# Patient Record
Sex: FEMALE | Race: WHITE | URBAN - METROPOLITAN AREA
[De-identification: names, ages, dates, MRNs, and addresses within clinical notes are randomized per-mention and may not be internally consistent; named-entity substitution may affect disease eponyms.]

---

## 2022-08-15 NOTE — PROGRESS NOTES
Butch Cameron is a healthy 23 y.o. female who presents to establish care. She is a student at [employer]. Medical history significant for: Generalized Anxiety and Borderline Personality Disorder  - Sees Psychiatrist through [facility]. Ishmael Flank Therapist  - Reports skin alta on left forearm since Thursday and right abdomen since Saturday. - 1-2 significant headaches every month. Relieved with ibuprofen. No changes in vision  - Follows optometrist regularly and wears glasses and contact lenses. - Lactose intolerance and gluten: bloating, cramps, and nausea with some dairy and high gluten foods. She feels she manages this well with her dietary choices. [facility]  Saw gynecologist last year and has future appointment scheduled. Notes history of cysts, well controlled. On OCP's    Review of Systems   General/Constitutional:   No headache, fever, fatigue, weight loss or weight gain       Eyes:   No redness, pruritis, pain, visual changes, swelling, or discharge      Ears:    No pain, loss or changes in hearing     Neck:   No swelling, masses, stiffness, pain, or limited movement     Cardiac:    No chest pain      Respiratory:   No cough or shortness of breath     GI:   No nausea/vomiting, diarrhea, abdominal pain, bloody or dark stools       :   No dysuria or  hematuria    Neurological:   No loss of consciousness, dizziness, seizures, dysarthria, cognitive changes, memory changes,  problems with balance, or unilateral weakness     Skin: Two skin lesions noted: one on left forearm and one on right abdomen (see physical for picture and description)    Current Medications  Current medications include:   Current Outpatient Medications   Medication Sig    Junel FE 1/20, 28, 1 mg-20 mcg (21)/75 mg (7) tab TAKE 1 TABLET BY MOUTH EVERY DAY FOR 90 DAYS    cetirizine (ZyrTEC) 10 mg tablet Take 10 mg by mouth two (2) times a day.     FLUoxetine (PROzac) 20 mg capsule Take 1 Capsule by mouth in the morning. Indications: repeated episodes of anxiety    hydrOXYzine HCL (ATARAX) 10 mg tablet Take 1 Tablet by mouth three (3) times daily as needed for Anxiety for up to 10 days. No current facility-administered medications for this visit. Allergies  No Known Allergies    Past Medical History  Past Medical History:   Diagnosis Date    Borderline personality disorder ([facility] [address]     Cyst of ovary     SARAH (generalized anxiety disorder)        Past Surgical History   Past Surgical History:   Procedure Laterality Date    HX KNEE ARTHROSCOPY Left     remove needle from knee       Family History  Family History   Problem Relation Age of Onset    Hypertension Mother        Social History  Social History     Socioeconomic History    Marital status: SINGLE     Spouse name: Not on file    Number of children: Not on file    Years of education: Not on file    Highest education level: Not on file   Occupational History    Not on file   Tobacco Use    Smoking status: Never    Smokeless tobacco: Never   Vaping Use    Vaping Use: Never used   Substance and Sexual Activity    Alcohol use: Yes     Alcohol/week: 1.0 standard drink     Types: 1 Standard drinks or equivalent per week    Drug use: Never    Sexual activity: Not Currently   Other Topics Concern    Not on file   Social History Narrative    Not on file     Social Determinants of Health     Financial Resource Strain: Not on file   Food Insecurity: Not on file   Transportation Needs: Not on file   Physical Activity: Not on file   Stress: Not on file   Social Connections: Not on file   Intimate Partner Violence: Not on file   Housing Stability: Not on file     Immunizations    There is no immunization history on file for this patient.       Objective   Vital Signs  Visit Vitals  /67 (BP 1 Location: Right arm, BP Patient Position: Sitting, BP Cuff Size: Adult)   Pulse 91   Temp 98.3 °F (36.8 °C) (Oral)   Resp 18   Ht 5' 3\" (1.6 m)   Wt 137 lb (62.1 kg)   LMP 08/09/2022 SpO2 98%   BMI 24.27 kg/m²       Physical Examination  GEN: No apparent distress. Alert and oriented and responds to all questions appropriately. EYES:  Conjunctiva clear; pupils round and reactive to light; extraocular movements areintact. EAR: External ears are normal.  Tympanic membranes are clear and without effusion. NOSE: Turbinates are within normal limits. No drainage  OROPHYARYNX: No oral lesions or exudates. NECK:  Supple; no masses; thyroid normal           LUNGS: Respirations unlabored; clear to auscultation bilaterally  CARDIOVASCULAR: Regular, rate, and rhythm without murmurs, gallops or rubs   ABDOMEN: Soft; nontender; nondistended; normoactive bowel sounds; no masses or organomegaly  NEUROLOGIC:  No focal neurologic deficits. Strength and sensation grossly intact. Coordination and gait grossly intact. EXT: Well perfused. No edema. SKIN: Slightly raised erythematous papule on left abdomen for 4 days        Assessment:   Silverio Hoover is a 23 y.o. here to establish to care with a history of generalized anxiety disorder. Plan:   Generalized Anxiety Disorder: Currently in-between psychiatrists and in need of medication refill. Overall, feels anxiety is well controlled. Reports coping mechanisms such as finger tapping and breathing with spikes of anxiety. Plays video games and other hobbies to decompress  - Refill atarax, prozac  - Requesting labwork ad direction of her new Psychiatrist as listed:  CMP, CBC, B12, Vitamin D, magnesium, TSH, A1c, Lipid Panel  New Patient Establishing:   - Ordering these labs listed above which will help illicit any underlying issues and will help establish a baseline as she establishes with the practice  - Up to date on vaccine. Received meningococcal vaccine when starting college  Appropriate labs, vaccines, imaging studies, and referrals ordered as listed above  Patient will be leaving to go back to [facility] on Saturday.  Return visit in one year  Skin Lesion: Depicted and described above. Likely some sort of insect bite  - Instructed not to scratch it and to monitor for any signs of changes in size, coloration, or border, as well as any signs of sickness such as fever. Told to call the clinic or go to ER if any of these symptoms present themselves.  -Likely will resolve spontaneously. Follow-up and Dispositions    Return in about 1 year (around 8/16/2023). I discussed the aforementioned diagnoses with the patient as well as the plan of care.      I discussed this patient with Dr. Jan Gonzales (Attending Physician)    Signed By:  Thien Elelr DO

## 2022-08-16 ENCOUNTER — OFFICE VISIT (OUTPATIENT)
Dept: FAMILY MEDICINE CLINIC | Age: 22
End: 2022-08-16
Payer: COMMERCIAL

## 2022-08-16 VITALS
OXYGEN SATURATION: 98 % | HEIGHT: 63 IN | TEMPERATURE: 98.3 F | RESPIRATION RATE: 18 BRPM | DIASTOLIC BLOOD PRESSURE: 67 MMHG | BODY MASS INDEX: 24.27 KG/M2 | WEIGHT: 137 LBS | HEART RATE: 91 BPM | SYSTOLIC BLOOD PRESSURE: 103 MMHG

## 2022-08-16 DIAGNOSIS — Z76.89 ENCOUNTER TO ESTABLISH CARE: ICD-10-CM

## 2022-08-16 DIAGNOSIS — F41.1 GENERALIZED ANXIETY DISORDER: ICD-10-CM

## 2022-08-16 DIAGNOSIS — Z00.00 WELL ADULT EXAM: ICD-10-CM

## 2022-08-16 PROCEDURE — 99385 PREV VISIT NEW AGE 18-39: CPT

## 2022-08-16 RX ORDER — CETIRIZINE HCL 10 MG
10 TABLET ORAL 2 TIMES DAILY
COMMUNITY

## 2022-08-16 RX ORDER — FLUOXETINE HYDROCHLORIDE 20 MG/1
20 CAPSULE ORAL DAILY
Qty: 30 CAPSULE | Refills: 3 | Status: SHIPPED | OUTPATIENT
Start: 2022-08-16

## 2022-08-16 RX ORDER — NORETHINDRONE ACETATE AND ETHINYL ESTRADIOL AND FERROUS FUMARATE 1MG-20(21)
KIT ORAL
COMMUNITY
Start: 2022-08-06

## 2022-08-16 RX ORDER — HYDROXYZINE HYDROCHLORIDE 10 MG/1
10 TABLET, FILM COATED ORAL
Qty: 30 TABLET | Refills: 0 | Status: SHIPPED | OUTPATIENT
Start: 2022-08-16 | End: 2022-08-26

## 2022-08-16 RX ORDER — FLUOXETINE 20 MG/1
TABLET ORAL
COMMUNITY
Start: 2022-07-01 | End: 2022-08-16 | Stop reason: SDUPTHER

## 2022-08-16 RX ORDER — HYDROXYZINE HYDROCHLORIDE 10 MG/1
TABLET, FILM COATED ORAL
COMMUNITY
Start: 2022-08-06 | End: 2022-08-16 | Stop reason: SDUPTHER

## 2022-08-16 NOTE — PROGRESS NOTES
Identified Patient with two Patient identifiers (Name and ). Two Patient Identifiers confirmed. Reviewed record in preparation for visit and have obtained necessary documentation. Chief Complaint   Patient presents with    Establish Care    Physical       Visit Vitals  /67 (BP 1 Location: Right arm, BP Patient Position: Sitting, BP Cuff Size: Adult)   Pulse 91   Temp 98.3 °F (36.8 °C) (Oral)   Resp 18   Ht 5' 3\" (1.6 m)   Wt 137 lb (62.1 kg)   SpO2 98%   BMI 24.27 kg/m²       1. Have you been to the ER, urgent care clinic since your last visit? Hospitalized since your last visit? No    2. Have you seen or consulted any other health care providers outside of the [address] [place] [address] since your last visit? Include any pap smears or colon screening.  No

## 2022-08-16 NOTE — PATIENT INSTRUCTIONS
It was a pleasure meeting you today. I have refilled your atarax and prozac. Please monitor your skin alta on your left abdomen for changes. Try not to scratch or irritate it. Please contact us with any concerning changes.

## 2022-08-17 LAB
25(OH)D3+25(OH)D2 SERPL-MCNC: 16.1 NG/ML (ref 30–100)
ALBUMIN SERPL-MCNC: 4.6 G/DL (ref 3.9–5)
ALBUMIN/GLOB SERPL: 1.9 {RATIO} (ref 1.2–2.2)
ALP SERPL-CCNC: 99 IU/L (ref 42–106)
ALT SERPL-CCNC: 11 IU/L (ref 0–32)
AST SERPL-CCNC: 18 IU/L (ref 0–40)
BILIRUB SERPL-MCNC: 0.2 MG/DL (ref 0–1.2)
BUN SERPL-MCNC: 8 MG/DL (ref 6–20)
BUN/CREAT SERPL: 12 (ref 9–23)
CALCIUM SERPL-MCNC: 9.5 MG/DL (ref 8.7–10.2)
CHLORIDE SERPL-SCNC: 102 MMOL/L (ref 96–106)
CHOLEST SERPL-MCNC: 228 MG/DL (ref 100–199)
CO2 SERPL-SCNC: 20 MMOL/L (ref 20–29)
CREAT SERPL-MCNC: 0.68 MG/DL (ref 0.57–1)
EGFR: 128 ML/MIN/1.73
ERYTHROCYTE [DISTWIDTH] IN BLOOD BY AUTOMATED COUNT: 12.3 % (ref 11.7–15.4)
EST. AVERAGE GLUCOSE BLD GHB EST-MCNC: 103 MG/DL
GLOBULIN SER CALC-MCNC: 2.4 G/DL (ref 1.5–4.5)
GLUCOSE SERPL-MCNC: 91 MG/DL (ref 65–99)
HBA1C MFR BLD: 5.2 % (ref 4.8–5.6)
HCT VFR BLD AUTO: 41.1 % (ref 34–46.6)
HDLC SERPL-MCNC: 45 MG/DL
HGB BLD-MCNC: 13.3 G/DL (ref 11.1–15.9)
IMP & REVIEW OF LAB RESULTS: NORMAL
LDLC SERPL CALC-MCNC: 145 MG/DL (ref 0–99)
MCH RBC QN AUTO: 27.7 PG (ref 26.6–33)
MCHC RBC AUTO-ENTMCNC: 32.4 G/DL (ref 31.5–35.7)
MCV RBC AUTO: 86 FL (ref 79–97)
PLATELET # BLD AUTO: 286 X10E3/UL (ref 150–450)
POTASSIUM SERPL-SCNC: 4.6 MMOL/L (ref 3.5–5.2)
PROT SERPL-MCNC: 7 G/DL (ref 6–8.5)
RBC # BLD AUTO: 4.8 X10E6/UL (ref 3.77–5.28)
SODIUM SERPL-SCNC: 139 MMOL/L (ref 134–144)
TRIGL SERPL-MCNC: 209 MG/DL (ref 0–149)
TSH SERPL DL<=0.005 MIU/L-ACNC: 1 UIU/ML (ref 0.45–4.5)
VIT B12 SERPL-MCNC: 357 PG/ML (ref 232–1245)
VLDLC SERPL CALC-MCNC: 38 MG/DL (ref 5–40)
WBC # BLD AUTO: 8.2 X10E3/UL (ref 3.4–10.8)

## 2022-08-18 NOTE — PROGRESS NOTES
Hello! I have reviewed your lab work. You are vitamin D deficient. I would suggest taking a vitamin D supplement (at least 1,000 IUs per day. You can get these at any pharmacy such as [facility] or even online on [employer]. You can get softgels or gummies. A brand I like is [employer] [place] [employer] (they have a vitamin D3 gummy). Take one per day. Your vitamin B12 is a bit low as well. Good sources of vitamin B12 come from animal products such as grass-fed beef, free-range eggs, and fish such as salmon. You can also supplement. Your cholesterol is a bit elevated, but that may have been due to eating lunch prior to lab testing. No concern now - just focus on getting regular exercise and eating a healthy diet. Your other labs look great. No signs of any thyroid abnormality. The levels we check for kidney, liver, and electrolytes are all normal.   Please reach out if you have any questions. It was nice meeting you.

## 2022-11-25 ENCOUNTER — OFFICE VISIT (OUTPATIENT)
Dept: FAMILY MEDICINE CLINIC | Age: 22
End: 2022-11-25
Payer: COMMERCIAL

## 2022-11-25 VITALS
OXYGEN SATURATION: 97 % | WEIGHT: 131 LBS | DIASTOLIC BLOOD PRESSURE: 87 MMHG | HEART RATE: 89 BPM | SYSTOLIC BLOOD PRESSURE: 126 MMHG | BODY MASS INDEX: 23.21 KG/M2

## 2022-11-25 DIAGNOSIS — F32.1 MODERATE MAJOR DEPRESSION (HCC): ICD-10-CM

## 2022-11-25 DIAGNOSIS — R41.840 ATTENTION AND CONCENTRATION DEFICIT: Primary | ICD-10-CM

## 2022-11-25 DIAGNOSIS — F41.1 GENERALIZED ANXIETY DISORDER: ICD-10-CM

## 2022-11-25 DIAGNOSIS — F31.0 BIPOLAR AFFECTIVE DISORDER, CURRENT EPISODE HYPOMANIC (HCC): ICD-10-CM

## 2022-11-25 PROCEDURE — 99214 OFFICE O/P EST MOD 30 MIN: CPT | Performed by: STUDENT IN AN ORGANIZED HEALTH CARE EDUCATION/TRAINING PROGRAM

## 2022-11-25 RX ORDER — HYDROXYZINE HYDROCHLORIDE 10 MG/1
10 TABLET, FILM COATED ORAL 2 TIMES DAILY
COMMUNITY
Start: 2022-11-04

## 2022-11-25 RX ORDER — LAMOTRIGINE 50 MG/1
TABLET, ORALLY DISINTEGRATING ORAL
COMMUNITY
Start: 2022-11-04

## 2022-11-25 NOTE — PROGRESS NOTES
Subjective  Tamra Estrada is an 23 y.o. female who presents for evaluation of ADHD symptoms. She would like to be evaluated for ADHD. States her mother has ADHD. Struggling to complete her school work tasks and struggles with lack of focus. Feels sometimes lack of focus and procrastination symptoms. Studying Bio-education major and Art minor at college. 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 Denies any difficulties at school growing up. Was in the gifted program at school and advanced reading program.       She has a history of Bipolar disorder on Lamotrigine which she was recently diagnosed. Follows with Psychiatry for bipolar disorder  Prince Alejandro MD. Patient also follows with counselor at her college. Has been working with him for over a year. Attempting to work on her  organizational and coping skills without any benefits. No toxic habits. No illicit drug use. States some weeks ago was in the deepest depression, she had felt before but her symptoms have improved since starting Lamictal.     Review of Systems   Constitutional:  Negative for fever. Respiratory:  Negative for cough and shortness of breath. Cardiovascular:  Negative for chest pain. Gastrointestinal:  Negative for nausea and vomiting. Psychiatric/Behavioral:  Negative for hallucinations, substance abuse and suicidal ideas. The patient is not nervous/anxious. Past Medical History - reviewed:  Past Medical History:   Diagnosis Date    Borderline personality disorder ([address]     Cyst of ovary     SARAH (generalized anxiety disorder)        Medications - reviewed:   Current Outpatient Medications   Medication Sig    lamoTRIgine (LaMICtal) 50 mg disintegrating tablet     hydrOXYzine HCL (ATARAX) 10 mg tablet Take 10 mg by mouth two (2) times a day.     Junel FE 1/20, 28, 1 mg-20 mcg (21)/75 mg (7) tab TAKE 1 TABLET BY MOUTH EVERY DAY FOR 90 DAYS    cetirizine (ZYRTEC) 10 mg tablet Take 10 mg by mouth two (2) times a day. FLUoxetine (PROzac) 20 mg capsule Take 1 Capsule by mouth in the morning. Indications: repeated episodes of anxiety     No current facility-administered medications for this visit. Past Surgical History - reviewed:   Past Surgical History:   Procedure Laterality Date    HX KNEE ARTHROSCOPY Left     remove needle from knee         Social History - reviewed:  Social History     Socioeconomic History    Marital status: SINGLE     Spouse name: Not on file    Number of children: Not on file    Years of education: Not on file    Highest education level: Not on file   Occupational History    Not on file   Tobacco Use    Smoking status: Never    Smokeless tobacco: Never   Vaping Use    Vaping Use: Never used   Substance and Sexual Activity    Alcohol use: Yes     Alcohol/week: 1.0 standard drink     Types: 1 Standard drinks or equivalent per week    Drug use: Never    Sexual activity: Not Currently   Other Topics Concern    Not on file   Social History Narrative    Not on file     Social Determinants of Health     Financial Resource Strain: Not on file   Food Insecurity: Not on file   Transportation Needs: Not on file   Physical Activity: Not on file   Stress: Not on file   Social Connections: Not on file   Intimate Partner Violence: Not on file   Housing Stability: Not on file         Family History - reviewed:  Family History   Problem Relation Age of Onset    Hypertension Mother        Allergies - reviewed:   No Known Allergies    Immunizations - reviewed: There is no immunization history on file for this patient. Physical Exam  Visit Vitals  /87 (BP 1 Location: Left arm, BP Patient Position: Sitting, BP Cuff Size: Adult)   Pulse 89   Wt 131 lb (59.4 kg)   LMP 11/01/2022   SpO2 97%   BMI 23.21 kg/m²       General AO x 3. No distress. Not diaphoretic. No jaundice. No cyanosis. No pallor. HEENT Normocephalic, atraumatic. Neck supple, no cervical adenopathy. EOMI.  Cardio  Normal rate, regular rhythm. No murmur, rubs, or gallop. Pulmonary Effort normal. No accessory muscle use. No wheezes, rales, or rhonchi. Abdominal Soft. Bowel sounds normal. No tenderness. No distension. Extremities No edema of lower extremities. Pulses 2+. MSK  FROM, no joint swelling or tenderness. Neurological CN II-XII grossly intact. No focal deficits. Assessment/Plan:     23year old evaluation of poor attention and concentration. Desires neuropsychological testing to screen for ADHD. Patient with hx of bipolar disorder, anxiety and depression. Following with counselor and Psychiatry outpatient. PHQ-9 and SARAH-score 11, consistent with moderate depression and anxiety. No suicidal ideations or plans. ICD-10-CM ICD-9-CM    1. Attention and concentration deficit  R41.840 799.51 REFERRAL TO NEUROPSYCHOLOGY      REFERRAL TO NEUROPSYCHOLOGY      2. Generalized anxiety disorder  F41.1 300.02       3. Bipolar affective disorder, current episode hypomanic ([facility] [address]  F31.0 296.40       4. Moderate major depression ([facility])  F32.1 296.22         Attention and concentration deficits: reports problems with lack of focus and procrastination at her college. No previous official testing or diagnosis. + Family hx in her mother. No struggles in school growing up. - Referral for official testing, provided several contacts and discussed that there is a possible wait time for testing.  - Will reach out to her Psychiatrist to evaluate if patient could benefit from switching antidepressants (I.e. Wellbutrin) to monitor for improvement of symptoms.   - Continue working with counselor outpatient. Bipolar disorder: reports medication is working well for her. Continue Lamictal 25 mg BID. Continue working with Psychiatrist and counselor.  - Patient signed records release form. Moderate Depression and anxiety: symptoms improved with Atarax and Fluoxetine.  Continue current tx, continue working with counelor outpatient. I have discussed the diagnosis with the patient and the intended plan as seen in the above orders. Patient verbalized understanding of the plan and agrees with the plan. The patient has received an after-visit summary and questions were answered concerning future plans. I have discussed medication side effects and warnings with the patient as well. Informed patient to return to the office if new symptoms arise.     Patient discussed with Dr. Mita Clark (Attending physician)     [facility], MD  Family Medicine Resident

## 2022-11-25 NOTE — PATIENT INSTRUCTIONS
Call for [address]: Neda Guidry [facility]  Address: [address], [address]  Phone: [phone]      Erick Vieira, Ph.D.  Address: [place] [address] [address], [facility]  Phone: [phone]

## 2022-11-25 NOTE — PROGRESS NOTES
Chief Complaint   Patient presents with    Follow-up     Check for ADHD     Visit Vitals  /87 (BP 1 Location: Left arm, BP Patient Position: Sitting, BP Cuff Size: Adult)   Pulse 89   Wt 131 lb (59.4 kg)   SpO2 97%   BMI 23.21 kg/m²

## 2023-02-23 ENCOUNTER — OFFICE VISIT (OUTPATIENT)
Dept: FAMILY MEDICINE CLINIC | Age: 23
End: 2023-02-23
Payer: COMMERCIAL

## 2023-02-23 VITALS
RESPIRATION RATE: 16 BRPM | DIASTOLIC BLOOD PRESSURE: 73 MMHG | OXYGEN SATURATION: 98 % | WEIGHT: 131 LBS | SYSTOLIC BLOOD PRESSURE: 118 MMHG | HEIGHT: 63 IN | BODY MASS INDEX: 23.21 KG/M2 | TEMPERATURE: 98.3 F | HEART RATE: 84 BPM

## 2023-02-23 DIAGNOSIS — M54.50 ACUTE MIDLINE LOW BACK PAIN WITHOUT SCIATICA: Primary | ICD-10-CM

## 2023-02-23 NOTE — PROGRESS NOTES
Antoine Shaffer is a 23 y.o. female    Chief Complaint   Patient presents with    Back Pain     Patient is a cheerleader. She mentioned that she has been having low back pain since 3 weeks. She noticed that yesterday her back hurt more. She noticed a knot on her spine last night. She also mentioned her legs felt tingling. She noticed her left arm was also going numb when she was laying flat, Pain is 6 ou 10. No other concerns. 1. Have you been to the ER, urgent care clinic since your last visit? Hospitalized since your last visit? No    2. Have you seen or consulted any other health care providers outside of the [address] [place] [address] since your last visit? Include any pap smears or colon screening. No      Visit Vitals  /73 (BP 1 Location: Right upper arm, BP Patient Position: Sitting)   Pulse 84   Temp 98.3 °F (36.8 °C) (Oral)   Resp 16   Ht 5' 3\" (1.6 m)   Wt 131 lb (59.4 kg)   SpO2 98%   BMI 23.21 kg/m²           Health Maintenance Due   Topic Date Due    Hepatitis C Screening  Never done    COVID-19 Vaccine (1) Never done    DTaP/Tdap/Td series (1 - Tdap) Never done    HPV Age 9Y-34Y (1 - 2-dose series) Never done    Flu Vaccine (1) Never done         Medication Reconciliation completed, changes noted.   Please  Update medication list.
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
## 2023-04-05 ENCOUNTER — TELEPHONE (OUTPATIENT)
Dept: FAMILY MEDICINE CLINIC | Age: 23
End: 2023-04-05

## 2023-04-05 NOTE — TELEPHONE ENCOUNTER
----- Message from Aaliyah Pratt sent at 4/3/2023  2:17 PM EDT -----  Subject: Message to Provider    QUESTIONS  Information for Provider? Ney Paul would like you to call her to advise if   you do testing for celiacs.  ---------------------------------------------------------------------------  --------------  Mallory Banegas INFO  [phone]; OK to leave message on voicemail  ---------------------------------------------------------------------------  --------------  SCRIPT ANSWERS  Relationship to Patient?  Self

## 2023-05-15 ENCOUNTER — TELEPHONE (OUTPATIENT)
Age: 23
End: 2023-05-15

## 2023-05-15 DIAGNOSIS — F31.0 BIPOLAR DISORDER, CURRENT EPISODE HYPOMANIC (HCC): Primary | ICD-10-CM

## 2023-05-15 DIAGNOSIS — F32.1 MAJOR DEPRESSIVE DISORDER, SINGLE EPISODE, MODERATE (HCC): ICD-10-CM

## 2023-05-15 RX ORDER — FLUOXETINE HYDROCHLORIDE 20 MG/1
20 CAPSULE ORAL DAILY
Qty: 90 CAPSULE | Refills: 2 | Status: SHIPPED | OUTPATIENT
Start: 2023-05-15

## 2023-05-15 RX ORDER — LAMOTRIGINE 25 MG/1
25 TABLET ORAL 3 TIMES DAILY
Qty: 270 TABLET | Refills: 2 | Status: SHIPPED | OUTPATIENT
Start: 2023-05-15 | End: 2023-08-13

## 2023-05-15 NOTE — TELEPHONE ENCOUNTER
Patient needs refills on:    Fluoxetine 20 mg 1 cap daily  Lamotragine 25mg 1 tab three times daily. Wants it to go to [facility] [address] [place] [address]    Thanks!

## 2023-06-20 ENCOUNTER — NURSE TRIAGE (OUTPATIENT)
Dept: OTHER | Facility: CLINIC | Age: 23
End: 2023-06-20

## 2023-06-20 NOTE — TELEPHONE ENCOUNTER
[place]    Received call from Britni at [facility] with [email or web address]. Subjective: Caller states \"Both of these joints have recently popped out within the last day. The hip thing has happened 2x in the past 2 months. I popped it back in. The pain lasted for a couples hours after and then it was fine. My hips pop pretty regularly. The shoulder happened literally maybe 2.5 hours ago. \"     Current Symptoms: right hip pain, right shoulder pain     Onset: 1 day ago;     Associated Symptoms: NA    Pain Severity: 6/10; burning; constant right shoulder     Temperature: denies     What has been tried: ibuprofen     LMP: end of may  Pregnant: No    Recommended disposition: See in Office Today - recommended UCC if unable to get in. Care advice provided, patient verbalizes understanding; denies any other questions or concerns; instructed to call back for any new or worsening symptoms. Patient/Caller agrees with recommended disposition; writer provided warm transfer to [facility] at [facility] for appointment scheduling    Attention Provider: Thank you for allowing me to participate in the care of your patient. The patient was connected to triage in response to information provided to the ECC/PSC. Please do not respond through this encounter as the response is not directed to a shared pool.     Reason for Disposition   Caused by overuse from recent vigorous activity (e.g., tennis, other sports, work involving overhead lifting)   Caused by strained muscle   Numbness (i.e., loss of sensation) in hand or fingers    Protocols used: Shoulder Pain-ADULT-[place]

## 2023-07-05 ENCOUNTER — NURSE TRIAGE (OUTPATIENT)
Dept: OTHER | Facility: CLINIC | Age: 23
End: 2023-07-05
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
## 2023-07-24 SDOH — ECONOMIC STABILITY: TRANSPORTATION INSECURITY
IN THE PAST 12 MONTHS, HAS LACK OF TRANSPORTATION KEPT YOU FROM MEETINGS, WORK, OR FROM GETTING THINGS NEEDED FOR DAILY LIVING?: PATIENT DECLINED

## 2023-07-24 SDOH — ECONOMIC STABILITY: HOUSING INSECURITY
IN THE LAST 12 MONTHS, WAS THERE A TIME WHEN YOU DID NOT HAVE A STEADY PLACE TO SLEEP OR SLEPT IN A SHELTER (INCLUDING NOW)?: PATIENT REFUSED

## 2023-07-24 SDOH — ECONOMIC STABILITY: INCOME INSECURITY: HOW HARD IS IT FOR YOU TO PAY FOR THE VERY BASICS LIKE FOOD, HOUSING, MEDICAL CARE, AND HEATING?: PATIENT DECLINED

## 2023-07-24 SDOH — ECONOMIC STABILITY: FOOD INSECURITY: WITHIN THE PAST 12 MONTHS, THE FOOD YOU BOUGHT JUST DIDN'T LAST AND YOU DIDN'T HAVE MONEY TO GET MORE.: PATIENT DECLINED

## 2023-07-24 SDOH — ECONOMIC STABILITY: FOOD INSECURITY: WITHIN THE PAST 12 MONTHS, YOU WORRIED THAT YOUR FOOD WOULD RUN OUT BEFORE YOU GOT MONEY TO BUY MORE.: PATIENT DECLINED

## 2023-07-26 ENCOUNTER — OFFICE VISIT (OUTPATIENT)
Age: 23
End: 2023-07-26
Payer: COMMERCIAL

## 2023-07-26 VITALS
DIASTOLIC BLOOD PRESSURE: 84 MMHG | BODY MASS INDEX: 22.5 KG/M2 | WEIGHT: 127 LBS | OXYGEN SATURATION: 98 % | HEART RATE: 107 BPM | HEIGHT: 63 IN | SYSTOLIC BLOOD PRESSURE: 123 MMHG | RESPIRATION RATE: 16 BRPM

## 2023-07-26 DIAGNOSIS — R19.7 DIARRHEA, UNSPECIFIED TYPE: ICD-10-CM

## 2023-07-26 DIAGNOSIS — R42 DIZZINESS: Primary | ICD-10-CM

## 2023-07-26 DIAGNOSIS — T14.8XXA BRUISING: ICD-10-CM

## 2023-07-26 PROCEDURE — 99214 OFFICE O/P EST MOD 30 MIN: CPT

## 2023-07-26 RX ORDER — MECLIZINE HCL 12.5 MG/1
12.5 TABLET ORAL 3 TIMES DAILY PRN
Qty: 30 TABLET | Refills: 0 | Status: SHIPPED | OUTPATIENT
Start: 2023-07-26 | End: 2023-08-05

## 2023-07-27 LAB
BASOPHILS # BLD AUTO: 0.1 X10E3/UL (ref 0–0.2)
BASOPHILS NFR BLD AUTO: 1 %
BUN SERPL-MCNC: 10 MG/DL (ref 6–20)
BUN/CREAT SERPL: 15 (ref 9–23)
CALCIUM SERPL-MCNC: 9.1 MG/DL (ref 8.7–10.2)
CHLORIDE SERPL-SCNC: 105 MMOL/L (ref 96–106)
CO2 SERPL-SCNC: 23 MMOL/L (ref 20–29)
CREAT SERPL-MCNC: 0.65 MG/DL (ref 0.57–1)
EGFRCR SERPLBLD CKD-EPI 2021: 128 ML/MIN/1.73
EOSINOPHIL # BLD AUTO: 0.3 X10E3/UL (ref 0–0.4)
EOSINOPHIL NFR BLD AUTO: 3 %
ERYTHROCYTE [DISTWIDTH] IN BLOOD BY AUTOMATED COUNT: 12.9 % (ref 11.7–15.4)
GLUCOSE SERPL-MCNC: 85 MG/DL (ref 70–99)
HCT VFR BLD AUTO: 39.6 % (ref 34–46.6)
HGB BLD-MCNC: 12.8 G/DL (ref 11.1–15.9)
IMM GRANULOCYTES # BLD AUTO: 0 X10E3/UL (ref 0–0.1)
IMM GRANULOCYTES NFR BLD AUTO: 0 %
LYMPHOCYTES # BLD AUTO: 2.1 X10E3/UL (ref 0.7–3.1)
LYMPHOCYTES NFR BLD AUTO: 28 %
MCH RBC QN AUTO: 27.2 PG (ref 26.6–33)
MCHC RBC AUTO-ENTMCNC: 32.3 G/DL (ref 31.5–35.7)
MCV RBC AUTO: 84 FL (ref 79–97)
MONOCYTES # BLD AUTO: 0.6 X10E3/UL (ref 0.1–0.9)
MONOCYTES NFR BLD AUTO: 7 %
NEUTROPHILS # BLD AUTO: 4.7 X10E3/UL (ref 1.4–7)
NEUTROPHILS NFR BLD AUTO: 61 %
PLATELET # BLD AUTO: 254 X10E3/UL (ref 150–450)
POTASSIUM SERPL-SCNC: 4.5 MMOL/L (ref 3.5–5.2)
RBC # BLD AUTO: 4.7 X10E6/UL (ref 3.77–5.28)
SODIUM SERPL-SCNC: 140 MMOL/L (ref 134–144)
WBC # BLD AUTO: 7.7 X10E3/UL (ref 3.4–10.8)

## 2023-07-27 NOTE — PROGRESS NOTES
[facility]    Assessment and Plan:  Deirdre Bender was seen today for bleeding/bruising. Diagnoses and all orders for this visit:    Dizziness  -     CBC with Auto Differential; Future  -     Cancel: AMB POC URINE PREGNANCY TEST, VISUAL COLOR COMPARISON  -     meclizine (ANTIVERT) 12.5 MG tablet; Take 1 tablet by mouth 3 times daily as needed for Dizziness  -     Basic Metabolic Panel; Future  -     Basic Metabolic Panel  -     CBC with Auto Differential    Bruising  -     CBC with Auto Differential; Future    Diarrhea, unspecified type  -     Celiac Disease Panel; Future  -     Celiac Disease Panel       Plan:  - Based on pictures of bruises, very low concern for bleeding/clotting disorder or hematologic malignancy. Suspect incidental injuries causing bruises. Will get CBC and add further testing if necessary based on results. - Patient is currently on her menstrual cycle so could anticipate some anemia on CBC  - Orthostatic vitals in clinic negative -- see below. Did have bump in HR. Suspect dizziness and other symptoms may be multifactorial related to vertigo and POTS. Discussed with patient. Will give some prn Meclizine. - Will check electrolytes for dizziness as well  - Patient reports long standing diarrhea and requests celiac testing. No bloody stools. - Considered UPT, but patient denies any prior sexual activity. Subjective:  CC:   Chief Complaint   Patient presents with    Bleeding/Bruising       HPI:  Sharath Acosta is 26 y.o. female who presents today for bruising and dizziness.     Bruising: (see picture below)  - Easily bruises  - Worse over last 7 months  - Denies trauma  - Feels safe at home and in relationships, denies abuse  - Only on thighs  - no family history of bleeding or bruising disorders  - works at [employer] office    Dizziness:  - Lightheadedness and nausea  - With positional changes: sitting to standing or rolling over in bed  - Room spinning      Past Medical History:
I reviewed with the resident the medical history and the resident's findings on the physical examination. I discussed with the resident the patient's diagnosis and concur with the plan.      Sloan Aaron MD 7/27/2023
screen     DTaP/Tdap/Td vaccine (1 - Tdap)    Pap smear         Coordination of Care Questionnaire:  :   1. Have you been to the ER, urgent care clinic since your last visit? Hospitalized since your last visit? No    2. Have you seen or consulted any other health care providers outside of the [address] [place] [address] since your last visit? Include any pap smears or colon screening. No    This patient is accompanied in the office by her self  I have received verbal consent from Selma Sanders to discuss any/all medical information while they are present in the room.

## 2023-07-28 LAB
ENDOMYSIUM IGA SER QL: NEGATIVE
IGA SERPL-MCNC: 103 MG/DL (ref 87–352)
TTG IGA SER-ACNC: <2 U/ML (ref 0–3)

## 2023-09-14 DIAGNOSIS — R42 DIZZINESS: Primary | ICD-10-CM

## 2023-09-14 RX ORDER — MECLIZINE HCL 12.5 MG/1
12.5 TABLET ORAL 3 TIMES DAILY PRN
Qty: 30 TABLET | Refills: 0 | Status: SHIPPED | OUTPATIENT
Start: 2023-09-14 | End: 2023-09-24

## 2024-07-03 ENCOUNTER — OFFICE VISIT (OUTPATIENT)
Age: 24
End: 2024-07-03
Payer: COMMERCIAL

## 2024-07-03 VITALS
HEIGHT: 63 IN | WEIGHT: 132 LBS | TEMPERATURE: 98.1 F | DIASTOLIC BLOOD PRESSURE: 62 MMHG | HEART RATE: 70 BPM | BODY MASS INDEX: 23.39 KG/M2 | SYSTOLIC BLOOD PRESSURE: 108 MMHG | RESPIRATION RATE: 18 BRPM | OXYGEN SATURATION: 98 %

## 2024-07-03 DIAGNOSIS — F32.1 MAJOR DEPRESSIVE DISORDER, SINGLE EPISODE, MODERATE (HCC): ICD-10-CM

## 2024-07-03 DIAGNOSIS — R42 DIZZINESS ON STANDING: Primary | ICD-10-CM

## 2024-07-03 DIAGNOSIS — F31.0 BIPOLAR DISORDER, CURRENT EPISODE HYPOMANIC (HCC): ICD-10-CM

## 2024-07-03 PROCEDURE — 93000 ELECTROCARDIOGRAM COMPLETE: CPT

## 2024-07-03 PROCEDURE — 99214 OFFICE O/P EST MOD 30 MIN: CPT

## 2024-07-03 RX ORDER — DROSPIRENONE AND ESTETROL 3-14.2(28)
1 KIT ORAL DAILY
COMMUNITY

## 2024-07-03 RX ORDER — LAMOTRIGINE 25 MG/1
TABLET ORAL
Qty: 282 TABLET | Refills: 0 | Status: SHIPPED | OUTPATIENT
Start: 2024-07-03 | End: 2024-09-29

## 2024-07-03 RX ORDER — FLUOXETINE HYDROCHLORIDE 20 MG/1
20 CAPSULE ORAL DAILY
Qty: 30 CAPSULE | Refills: 1 | Status: SHIPPED | OUTPATIENT
Start: 2024-07-03

## 2024-07-03 ASSESSMENT — PATIENT HEALTH QUESTIONNAIRE - PHQ9
2. FEELING DOWN, DEPRESSED OR HOPELESS: MORE THAN HALF THE DAYS
1. LITTLE INTEREST OR PLEASURE IN DOING THINGS: SEVERAL DAYS
9. THOUGHTS THAT YOU WOULD BE BETTER OFF DEAD, OR OF HURTING YOURSELF: NOT AT ALL
3. TROUBLE FALLING OR STAYING ASLEEP: MORE THAN HALF THE DAYS
SUM OF ALL RESPONSES TO PHQ9 QUESTIONS 1 & 2: 3
SUM OF ALL RESPONSES TO PHQ QUESTIONS 1-9: 14
SUM OF ALL RESPONSES TO PHQ QUESTIONS 1-9: 14
10. IF YOU CHECKED OFF ANY PROBLEMS, HOW DIFFICULT HAVE THESE PROBLEMS MADE IT FOR YOU TO DO YOUR WORK, TAKE CARE OF THINGS AT HOME, OR GET ALONG WITH OTHER PEOPLE: SOMEWHAT DIFFICULT
7. TROUBLE CONCENTRATING ON THINGS, SUCH AS READING THE NEWSPAPER OR WATCHING TELEVISION: SEVERAL DAYS
SUM OF ALL RESPONSES TO PHQ QUESTIONS 1-9: 14
SUM OF ALL RESPONSES TO PHQ QUESTIONS 1-9: 14
6. FEELING BAD ABOUT YOURSELF - OR THAT YOU ARE A FAILURE OR HAVE LET YOURSELF OR YOUR FAMILY DOWN: MORE THAN HALF THE DAYS
4. FEELING TIRED OR HAVING LITTLE ENERGY: MORE THAN HALF THE DAYS
5. POOR APPETITE OR OVEREATING: NEARLY EVERY DAY
8. MOVING OR SPEAKING SO SLOWLY THAT OTHER PEOPLE COULD HAVE NOTICED. OR THE OPPOSITE, BEING SO FIGETY OR RESTLESS THAT YOU HAVE BEEN MOVING AROUND A LOT MORE THAN USUAL: SEVERAL DAYS

## 2024-07-03 ASSESSMENT — ANXIETY QUESTIONNAIRES
4. TROUBLE RELAXING: MORE THAN HALF THE DAYS
GAD7 TOTAL SCORE: 10
IF YOU CHECKED OFF ANY PROBLEMS ON THIS QUESTIONNAIRE, HOW DIFFICULT HAVE THESE PROBLEMS MADE IT FOR YOU TO DO YOUR WORK, TAKE CARE OF THINGS AT HOME, OR GET ALONG WITH OTHER PEOPLE: SOMEWHAT DIFFICULT
2. NOT BEING ABLE TO STOP OR CONTROL WORRYING: SEVERAL DAYS
3. WORRYING TOO MUCH ABOUT DIFFERENT THINGS: SEVERAL DAYS
7. FEELING AFRAID AS IF SOMETHING AWFUL MIGHT HAPPEN: SEVERAL DAYS
5. BEING SO RESTLESS THAT IT IS HARD TO SIT STILL: NOT AT ALL
6. BECOMING EASILY ANNOYED OR IRRITABLE: NEARLY EVERY DAY
1. FEELING NERVOUS, ANXIOUS, OR ON EDGE: MORE THAN HALF THE DAYS

## 2024-07-03 ASSESSMENT — COLUMBIA-SUICIDE SEVERITY RATING SCALE - C-SSRS
6. HAVE YOU EVER DONE ANYTHING, STARTED TO DO ANYTHING, OR PREPARED TO DO ANYTHING TO END YOUR LIFE?: NO
1. WITHIN THE PAST MONTH, HAVE YOU WISHED YOU WERE DEAD OR WISHED YOU COULD GO TO SLEEP AND NOT WAKE UP?: NO
2. HAVE YOU ACTUALLY HAD ANY THOUGHTS OF KILLING YOURSELF?: NO

## 2024-07-03 ASSESSMENT — ENCOUNTER SYMPTOMS
SHORTNESS OF BREATH: 0
ABDOMINAL PAIN: 0

## 2024-07-03 NOTE — PROGRESS NOTES
I saw and evaluated the patient, performing the key elements of the service.  I discussed the findings, assessment and plan with the resident and agree with the resident's findings and plan as documented in the resident's note.      
Room 23  Identified pt with two pt identifiers(name and ). Reviewed record in preparation for visit and have obtained necessary documentation.  Chief Complaint   Patient presents with    Annual Exam   Fasting. Pt states that she has been monitoring her heart rate and feels like she has POTS, and she would like to do the table test.  She would also like a letter of medical necessity for her insurance.  She would also like refills on her medications (Lamictal, Prozac).    Health Maintenance Due   Topic    Hepatitis B vaccine (1 of 3 - 3-dose series)    COVID-19 Vaccine (1)    Varicella vaccine (1 of 2 - 2-dose childhood series)    HPV vaccine (1 - 2-dose series)    HIV screen     Chlamydia/GC screen     Hepatitis C screen     DTaP/Tdap/Td vaccine (1 - Tdap)    Pap smear     Depression Monitoring        Vitals:    24 0758   BP: 108/62   Site: Right Upper Arm   Position: Sitting   Cuff Size: Small Adult   Pulse: 70   Resp: 18   Temp: 98.1 °F (36.7 °C)   TempSrc: Oral   SpO2: 98%   Weight: 59.9 kg (132 lb)   Height: 1.6 m (5' 3\")         \"Have you been to the ER, urgent care clinic since your last visit?  Hospitalized since your last visit?\"    NO    “Have you seen or consulted any other health care providers outside of [facility] since your last visit?”    NO     “Have you had a pap smear?”    YES - Where: 2024,[facility], Dr. Chani Singleton  Nurse/CMA to request most recent records if not in the chart    No cervical cancer screening on file             Click Here for Release of Records Request     This patient is accompanied in the office by her self.  I have received verbal consent from Que Del Rosario to discuss any/all medical information while they are present in the room.  
hypomanic (HCC)  2. Major depressive disorder, single episode, moderate (HCC)    Anxiety/Depression/Bipolar Disorder: uncontrolled at this time given she has been out of her medication for a month. Denies SI/HI.  - Today, PHQ-9 14, RASHI-7 10  - Refilled prozac 20mg daily  - Refilled lamictal. As she has been off of it for a month, will plan to taper up to her dose. Counseled patient that lamotrigine is a mood-stabilizing medication that can help with depression while decreasing risk of jorge in bipolar depression. Will taper slowly to reduce risk of SJS. Taper plan as followed:   -Weeks 1-2: 25mg daily   -Weeks 3-4: 50mg daily   -Weeks 5-6: 100mg daily  - Plan to follow up in 1 month to assess efficacy of medication.  - Provided patient with contact information for crisis response and stabilization team    Dizziness  Likely multifactorial including component of long-COVID vs. POTS.  - Had normal labs 6 months ago so will hold off on more for now  - ECG in clinic today NSR  - referral placed to neurology for tilt table testing    HCM:  - up to date of pap, last was 3 weeks ago at [facility]. Pt Signed records release today.     Patient to follow-up in 1 month for follow-up of depression.    I discussed this patient with Dr. Walsh (Attending Physician)     Karlie Olivarez MD  PGY-1

## 2024-07-03 NOTE — PATIENT INSTRUCTIONS
Lamotrigine is a mood-stabilizing medication that can help with depression while decreasing risk of jorge in bipolar depression.     This medication needs to be started low and titrated up slowly due to the risk of a very harmful skin condition called Deep-Johnsons. If you get a rash, stop the medication and call clinic immediately.     The following slow taper greatly reduces risk of side effects:     Week 1 - 2: Lamotrigine 25mg (Take 1 tab by mouth daily)  Week 3 - 4: Lamotrigine 50mg (Take 2 tabs by mouth daily)    Follow up in clinic before the end of 4 weeks to check if medication is beginning to work and to ensure no side effects.     Week 5 - 6: Lamotrigine 100mg (Take 1 tab by mouth daily)    After week 6, we can increase this medication as needed to help with depression.     [facility] - Crisis response and stabilization team  [phone]

## 2024-07-27 DIAGNOSIS — F31.0 BIPOLAR DISORDER, CURRENT EPISODE HYPOMANIC (HCC): ICD-10-CM

## 2024-07-27 DIAGNOSIS — F32.1 MAJOR DEPRESSIVE DISORDER, SINGLE EPISODE, MODERATE (HCC): ICD-10-CM

## 2024-07-29 RX ORDER — FLUOXETINE HYDROCHLORIDE 20 MG/1
CAPSULE ORAL DAILY
Qty: 90 CAPSULE | Refills: 1 | Status: SHIPPED | OUTPATIENT
Start: 2024-07-29

## 2024-08-06 ENCOUNTER — TELEPHONE (OUTPATIENT)
Age: 24
End: 2024-08-06

## 2024-09-30 DIAGNOSIS — F31.0 BIPOLAR DISORDER, CURRENT EPISODE HYPOMANIC (HCC): ICD-10-CM

## 2024-09-30 NOTE — TELEPHONE ENCOUNTER
Medication Refill Request    Que Del Rosario is requesting a refill of the following medication(s):   Requested Prescriptions     Pending Prescriptions Disp Refills    lamoTRIgine (LAMICTAL) 25 MG tablet [Pharmacy Med Name: LAMOTRIGINE 25 MG TABLET] 282 tablet 0     Sig: TAKE 1 TABLET BY MOUTH DAILY FOR 14 DAYS, THEN 2 TABLETS DAILY FOR 14 DAYS, THEN 4 TABLETS DAILY.        Listed PCP is Africa Olivarez MD   Last provider to prescribe medication: Africa Olivarez MD   Last Date of Medication Prescribed: 07/03/2024   Date of Last Office Visit at [facility]: 07/03/2024   FUTURE APPOINTMENT: No future appointments.    Please send refill to:    [facility]/pharmacy #[id number] - [place], [place] - [address] [facility] - P [phone] - F [phone] [address] [facility] [address]  Phone: [phone] Fax: [phone]    Please review request and approve or deny with recommendations.

## 2024-10-01 RX ORDER — LAMOTRIGINE 25 MG/1
100 TABLET ORAL DAILY
Qty: 120 TABLET | Refills: 0 | Status: SHIPPED | OUTPATIENT
Start: 2024-10-01

## 2024-10-23 PROBLEM — F41.1 GENERALIZED ANXIETY DISORDER: Status: ACTIVE | Noted: 2024-10-23

## 2024-10-23 PROBLEM — F31.0 BIPOLAR DISORDER, CURRENT EPISODE HYPOMANIC (HCC): Status: ACTIVE | Noted: 2024-10-23

## 2024-10-23 PROBLEM — F32.1 MAJOR DEPRESSIVE DISORDER, SINGLE EPISODE, MODERATE (HCC): Status: ACTIVE | Noted: 2024-10-23

## 2024-10-23 NOTE — PROGRESS NOTES
[address] [place] [address]    Office [phone], Fax [phone]      Subjective   Que Del Rosario is a 24 y.o. female who presents for Bipolar and Depression    Psychiatric disease  - Currently on Prozac 20mg QD and lamotrigine 100mg QD.   - She plans on establishing with her old therapist again who switched practice.   - She feels very good with the medication being restarted again after she lost her insurance and was off of it for a while. She is on her usual dose again.   - No SE from meds, especially no skin SE (risk for SJS with lamotrigine).     Preventive care:  - Due for influenza.   - Follows with OBGYN for pap. Uptodate. Last done last year.     Review of Systems   Review of Systems   Constitutional:  Negative for appetite change, fatigue and fever.   Respiratory:  Negative for chest tightness and shortness of breath.    Cardiovascular:  Negative for chest pain.   Gastrointestinal:  Negative for abdominal pain, blood in stool, constipation, diarrhea, nausea and vomiting.   Genitourinary:  Negative for dysuria.   Neurological:  Negative for headaches.   Psychiatric/Behavioral:  Negative for behavioral problems, self-injury and suicidal ideas.           Medical History  Past Medical History:   Diagnosis Date    Bipolar disorder (HCC)     Borderline personality disorder (HCC)     Cyst of ovary     Depression     RASHI (generalized anxiety disorder)     Headache        Medications  Current Outpatient Medications   Medication Sig    Ascorbic Acid (FREDIS-C PO) Take 1 tablet by mouth daily    VITAMIN D PO Take 1 tablet by mouth daily    VITAMIN K PO Take 1 tablet by mouth daily    BIOTIN PO Take 1 tablet by mouth daily    Multiple Vitamin (MULTIVITAMIN PO) Take 1 tablet by mouth daily    Cyanocobalamin (VITAMIN B12 PO) Take 1 tablet by mouth daily    lamoTRIgine (LAMICTAL) 100 MG tablet Take 1 tablet by mouth daily    FLUoxetine (PROZAC) 20 MG capsule TAKE 1 CAPSULE BY MOUTH EVERY DAY

## 2024-10-24 ENCOUNTER — OFFICE VISIT (OUTPATIENT)
Age: 24
End: 2024-10-24

## 2024-10-24 VITALS
DIASTOLIC BLOOD PRESSURE: 79 MMHG | WEIGHT: 132.6 LBS | HEIGHT: 63 IN | OXYGEN SATURATION: 98 % | HEART RATE: 72 BPM | TEMPERATURE: 98.4 F | RESPIRATION RATE: 18 BRPM | SYSTOLIC BLOOD PRESSURE: 116 MMHG | BODY MASS INDEX: 23.5 KG/M2

## 2024-10-24 DIAGNOSIS — F31.0 BIPOLAR DISORDER, CURRENT EPISODE HYPOMANIC (HCC): Primary | ICD-10-CM

## 2024-10-24 DIAGNOSIS — Z23 ENCOUNTER FOR IMMUNIZATION: ICD-10-CM

## 2024-10-24 DIAGNOSIS — F32.1 MAJOR DEPRESSIVE DISORDER, SINGLE EPISODE, MODERATE (HCC): ICD-10-CM

## 2024-10-24 DIAGNOSIS — F41.1 GENERALIZED ANXIETY DISORDER: ICD-10-CM

## 2024-10-24 RX ORDER — LAMOTRIGINE 100 MG/1
100 TABLET ORAL DAILY
Qty: 90 TABLET | Refills: 1 | Status: SHIPPED | OUTPATIENT
Start: 2024-10-24

## 2024-10-24 ASSESSMENT — ANXIETY QUESTIONNAIRES
GAD7 TOTAL SCORE: 4
IF YOU CHECKED OFF ANY PROBLEMS ON THIS QUESTIONNAIRE, HOW DIFFICULT HAVE THESE PROBLEMS MADE IT FOR YOU TO DO YOUR WORK, TAKE CARE OF THINGS AT HOME, OR GET ALONG WITH OTHER PEOPLE: NOT DIFFICULT AT ALL
3. WORRYING TOO MUCH ABOUT DIFFERENT THINGS: NOT AT ALL
5. BEING SO RESTLESS THAT IT IS HARD TO SIT STILL: NOT AT ALL
6. BECOMING EASILY ANNOYED OR IRRITABLE: SEVERAL DAYS
4. TROUBLE RELAXING: NOT AT ALL
1. FEELING NERVOUS, ANXIOUS, OR ON EDGE: MORE THAN HALF THE DAYS
7. FEELING AFRAID AS IF SOMETHING AWFUL MIGHT HAPPEN: NOT AT ALL
2. NOT BEING ABLE TO STOP OR CONTROL WORRYING: SEVERAL DAYS

## 2024-10-24 ASSESSMENT — PATIENT HEALTH QUESTIONNAIRE - PHQ9
5. POOR APPETITE OR OVEREATING: NOT AT ALL
SUM OF ALL RESPONSES TO PHQ9 QUESTIONS 1 & 2: 2
3. TROUBLE FALLING OR STAYING ASLEEP: MORE THAN HALF THE DAYS
10. IF YOU CHECKED OFF ANY PROBLEMS, HOW DIFFICULT HAVE THESE PROBLEMS MADE IT FOR YOU TO DO YOUR WORK, TAKE CARE OF THINGS AT HOME, OR GET ALONG WITH OTHER PEOPLE: NOT DIFFICULT AT ALL
7. TROUBLE CONCENTRATING ON THINGS, SUCH AS READING THE NEWSPAPER OR WATCHING TELEVISION: NOT AT ALL
9. THOUGHTS THAT YOU WOULD BE BETTER OFF DEAD, OR OF HURTING YOURSELF: NOT AT ALL
4. FEELING TIRED OR HAVING LITTLE ENERGY: SEVERAL DAYS
2. FEELING DOWN, DEPRESSED OR HOPELESS: SEVERAL DAYS
SUM OF ALL RESPONSES TO PHQ QUESTIONS 1-9: 5
6. FEELING BAD ABOUT YOURSELF - OR THAT YOU ARE A FAILURE OR HAVE LET YOURSELF OR YOUR FAMILY DOWN: NOT AT ALL
1. LITTLE INTEREST OR PLEASURE IN DOING THINGS: SEVERAL DAYS
SUM OF ALL RESPONSES TO PHQ QUESTIONS 1-9: 5
8. MOVING OR SPEAKING SO SLOWLY THAT OTHER PEOPLE COULD HAVE NOTICED. OR THE OPPOSITE, BEING SO FIGETY OR RESTLESS THAT YOU HAVE BEEN MOVING AROUND A LOT MORE THAN USUAL: NOT AT ALL
SUM OF ALL RESPONSES TO PHQ QUESTIONS 1-9: 5
SUM OF ALL RESPONSES TO PHQ QUESTIONS 1-9: 5

## 2024-10-24 ASSESSMENT — ENCOUNTER SYMPTOMS
ABDOMINAL PAIN: 0
NAUSEA: 0
CONSTIPATION: 0
SHORTNESS OF BREATH: 0
BLOOD IN STOOL: 0
DIARRHEA: 0
CHEST TIGHTNESS: 0
VOMITING: 0

## 2024-10-24 NOTE — PROGRESS NOTES
Identified pt with two pt identifiers(name and ). Reviewed record in preparation for visit and have obtained necessary documentation.  Chief Complaint   Patient presents with    Bipolar    Depression        Health Maintenance Due   Topic    Varicella vaccine (1 of 2 - 13+ 2-dose series)    HPV vaccine (1 - 3-dose series)    HIV screen     Chlamydia/GC screen     Hepatitis C screen     Hepatitis B vaccine (1 of 3 - 19+ 3-dose series)    DTaP/Tdap/Td vaccine (1 - Tdap)    Pap smear     Flu vaccine (1)    COVID-19 Vaccine ( -  season)       Vitals:    10/24/24 0811   BP: 116/79   Site: Right Upper Arm   Position: Sitting   Cuff Size: Medium Adult   Pulse: 72   Resp: 18   Temp: 98.4 °F (36.9 °C)   TempSrc: Oral   SpO2: 98%   Weight: 60.1 kg (132 lb 9.6 oz)   Height: 1.6 m (5' 3\")         \"Have you been to the ER, urgent care clinic since your last visit?  Hospitalized since your last visit?\"    NO    “Have you seen or consulted any other health care providers outside of [facility] since your last visit?”    NO     “Have you had a pap smear?”    YES , VPFW, Pt states results were normal Nurse/CMA to request most recent records if not in the chart    No cervical cancer screening on file             Click Here for Release of Records Request     This patient is accompanied in the office by her self.  I have received verbal consent from Que Del Rosario to discuss any/all medical information while they are present in the room.

## 2025-06-20 DIAGNOSIS — F32.1 MAJOR DEPRESSIVE DISORDER, SINGLE EPISODE, MODERATE (HCC): ICD-10-CM

## 2025-06-20 DIAGNOSIS — F31.0 BIPOLAR DISORDER, CURRENT EPISODE HYPOMANIC (HCC): ICD-10-CM

## 2025-06-20 NOTE — TELEPHONE ENCOUNTER
Medication Refill Request    Que Del Rosario is requesting a refill of the following medication(s):   Requested Prescriptions     Pending Prescriptions Disp Refills    FLUoxetine (PROZAC) 20 MG capsule [Pharmacy Med Name: FLUOXETINE HCL 20 MG CAPSULE] 90 capsule 1     Sig: TAKE 1 CAPSULE BY MOUTH EVERY DAY        Listed PCP is Africa Olivarez MD   Last provider to prescribe medication: Africa Olivarez MD   Last Date of Medication Prescribed: 07/29/2024   Date of Last Office Visit at [facility]: 10/24/24   FUTURE APPOINTMENT: No future appointments.    Please send refill to:    [facility]/pharmacy #[id number] - [place] - [address] [facility] - P [phone] - F [phone] [address] [place] [address]  Phone: [phone] Fax: [phone]    Please review request and approve or deny with recommendations.